# Patient Record
Sex: FEMALE | Race: WHITE | Employment: STUDENT | URBAN - METROPOLITAN AREA
[De-identification: names, ages, dates, MRNs, and addresses within clinical notes are randomized per-mention and may not be internally consistent; named-entity substitution may affect disease eponyms.]

---
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
## 2025-02-06 ENCOUNTER — OFFICE VISIT (OUTPATIENT)
Dept: INTERNAL MEDICINE CLINIC | Facility: CLINIC | Age: 20
End: 2025-02-06

## 2025-02-06 VITALS
DIASTOLIC BLOOD PRESSURE: 71 MMHG | HEIGHT: 61.42 IN | HEART RATE: 125 BPM | BODY MASS INDEX: 24.6 KG/M2 | OXYGEN SATURATION: 96 % | WEIGHT: 132 LBS | SYSTOLIC BLOOD PRESSURE: 130 MMHG

## 2025-02-06 DIAGNOSIS — N30.01 ACUTE CYSTITIS WITH HEMATURIA: ICD-10-CM

## 2025-02-06 DIAGNOSIS — R10.2 PELVIC PAIN: Primary | ICD-10-CM

## 2025-02-06 LAB
BILIRUBIN: NEGATIVE
GLUCOSE (URINE DIPSTICK): NEGATIVE MG/DL
KETONES (URINE DIPSTICK): NEGATIVE MG/DL
MULTISTIX LOT#: ABNORMAL NUMERIC
NITRITE, URINE: NEGATIVE
PH, URINE: 6 (ref 4.5–8)
PROTEIN (URINE DIPSTICK): 30 MG/DL
SPECIFIC GRAVITY: >=1.03 (ref 1–1.03)
UROBILINOGEN,SEMI-QN: 0.2 MG/DL (ref 0–1.9)

## 2025-02-06 PROCEDURE — 81003 URINALYSIS AUTO W/O SCOPE: CPT | Performed by: INTERNAL MEDICINE

## 2025-02-06 PROCEDURE — 99213 OFFICE O/P EST LOW 20 MIN: CPT | Performed by: INTERNAL MEDICINE

## 2025-02-06 RX ORDER — SULFAMETHOXAZOLE AND TRIMETHOPRIM 800; 160 MG/1; MG/1
1 TABLET ORAL 2 TIMES DAILY
Qty: 10 TABLET | Refills: 0 | Status: SHIPPED | OUTPATIENT
Start: 2025-02-06 | End: 2025-02-11

## 2025-02-06 NOTE — PROGRESS NOTES
Yesika Lockett is a 20 year old female.  Chief Complaint   Patient presents with    Acute     Pelvic pain     HPI:   Patient presented today with lower abdominal pain.  She states that morning she feels like she has to pee but nothing comes out.  No blood in her urine, no fever, but increased urgency and frequency.  She states that she has had.  For last 3 weeks which is normal since she got her 2 years ago.      Current Outpatient Medications   Medication Sig Dispense Refill    sulfamethoxazole-trimethoprim DS (BACTRIM DS) 800-160 MG Oral Tab per tablet Take 1 tablet by mouth 2 (two) times daily for 5 days. TAKE WITH FOOD. 10 tablet 0    albuterol 108 (90 Base) MCG/ACT Inhalation Aero Soln Inhale 2 puffs into the lungs every 6 (six) hours as needed for Wheezing (cough). (Patient not taking: Reported on 2/6/2025) 1 each 0    benzonatate 200 MG Oral Cap Take 1 capsule (200 mg total) by mouth 3 (three) times daily as needed for cough. (Patient not taking: Reported on 2/6/2025) 40 capsule 0      History reviewed. No pertinent past medical history.   History reviewed. No pertinent surgical history.   Social History:  Social History     Socioeconomic History    Marital status: Single   Tobacco Use    Smoking status: Never    Smokeless tobacco: Never   Vaping Use    Vaping status: Never Used   Substance and Sexual Activity    Alcohol use: Never    Drug use: Never     Social Drivers of Health     Food Insecurity: Unknown (12/7/2023)    Received from [facility]    Hunger Vital Sign     Worried About Running Out of Food in the Last Year: Never true   Transportation Needs: No Transportation Needs (9/3/2021)    Received from [facility]    PRAPARE - Transportation     Lack of Transportation (Medical): No     Lack of Transportation (Non-Medical): No      History reviewed. No pertinent family history.   Allergies[1]     REVIEW OF SYSTEMS:   Review of Systems   Review of Systems    Constitutional: Negative for activity change, appetite change and fever.   HENT: Negative for congestion and voice change.    Respiratory: Negative for cough and shortness of breath.    Cardiovascular: Negative for chest pain.   Gastrointestinal: Negative for abdominal distention, abdominal pain and vomiting.   Genitourinary: Negative for hematuria.   Skin: Negative for wound.   Psychiatric/Behavioral: Negative for behavioral problems.   Wt Readings from Last 5 Encounters:   02/06/25 132 lb (59.9 kg)   10/14/24 128 lb (58.1 kg) (50%, Z= 0.00)*     * Growth percentiles are based on CDC (Girls, 2-20 Years) data.     Body mass index is 24.6 kg/m².      EXAM:   /71   Pulse (!) 125   Ht 5' 1.42\" (1.56 m)   Wt 132 lb (59.9 kg)   LMP 02/06/2025 (Exact Date)   SpO2 96%   BMI 24.60 kg/m²   Physical Exam   Constitutional:       Appearance: Normal appearance.   HENT:      Head: Normocephalic.   Eyes:      Conjunctiva/sclera: Conjunctivae normal.   Breast:  Normal bilateral breast exam. No palpable masses or nodules.   No nipples asymmetry or discharge. No skin changes   Cardiovascular:      Rate and Rhythm: Normal rate and regular rhythm.      Heart sounds: Normal heart sounds. No murmur heard.  Pulmonary:      Effort: Pulmonary effort is normal.      Breath sounds: Normal breath sounds. No rhonchi or rales.   Abdominal:      General: Bowel sounds are normal.      Palpations: Abdomen is soft.      Tenderness: There is no abdominal tenderness.   Musculoskeletal:      Cervical back: Neck supple.      Right lower leg: No edema.      Left lower leg: No edema.   Skin:     General: Skin is warm and dry.   Neurological:      General: No focal deficit present.      Mental Status: He is alert and oriented to person, place, and time. Mental status is at baseline.   Psychiatric:         Mood and Affect: Mood normal.         Behavior: Behavior normal.       ASSESSMENT AND PLAN:     Assessment & Plan  Pelvic pain  - check  UA  Orders:    URINALYSIS, AUTO, W/O SCOPE    Acute cystitis with hematuria  - dip test positive for UTI   - start bactrim for 5 days   - increase hydration   - cranberry juice  - please also follow up with gyn for frequent periods.   Orders:    Urine Culture, Routine [E]; Future    sulfamethoxazole-trimethoprim DS (BACTRIM DS) 800-160 MG Oral Tab per tablet; Take 1 tablet by mouth 2 (two) times daily for 5 days. TAKE WITH FOOD.      The patient indicates understanding of these issues and agrees to the plan.      Etta Estrada MD     This note was created by Dragon voice recognition. Errors in content may be related to improper recognition by the system; efforts to review and correct have been done but errors may still exist. Please be advised the primary purpose of this note is for me to communicate medical care. Standard sentence structure is not always used. Medical terminology and medical abbreviations may be used. There may be grammatical, typographical, and automated fill ins that may have errors missed in proofreading.        [1] Not on File

## 2025-03-31 ENCOUNTER — OFFICE VISIT (OUTPATIENT)
Dept: OBGYN CLINIC | Facility: CLINIC | Age: 20
End: 2025-03-31

## 2025-03-31 ENCOUNTER — LAB ENCOUNTER (OUTPATIENT)
Dept: LAB | Age: 20
End: 2025-03-31
Attending: STUDENT IN AN ORGANIZED HEALTH CARE EDUCATION/TRAINING PROGRAM
Payer: COMMERCIAL

## 2025-03-31 VITALS
HEART RATE: 84 BPM | SYSTOLIC BLOOD PRESSURE: 118 MMHG | DIASTOLIC BLOOD PRESSURE: 77 MMHG | WEIGHT: 130 LBS | BODY MASS INDEX: 24 KG/M2

## 2025-03-31 DIAGNOSIS — N92.1 BREAKTHROUGH BLEEDING ON NEXPLANON: Primary | ICD-10-CM

## 2025-03-31 DIAGNOSIS — Z97.5 BREAKTHROUGH BLEEDING ON NEXPLANON: ICD-10-CM

## 2025-03-31 DIAGNOSIS — Z30.46 ENCOUNTER FOR REMOVAL OF SUBDERMAL CONTRACEPTIVE IMPLANT: ICD-10-CM

## 2025-03-31 DIAGNOSIS — Z97.5 BREAKTHROUGH BLEEDING ON NEXPLANON: Primary | ICD-10-CM

## 2025-03-31 DIAGNOSIS — N92.1 BREAKTHROUGH BLEEDING ON NEXPLANON: ICD-10-CM

## 2025-03-31 LAB
BASOPHILS # BLD AUTO: 0.04 X10(3) UL (ref 0–0.2)
BASOPHILS NFR BLD AUTO: 0.7 %
DEPRECATED RDW RBC AUTO: 38.5 FL (ref 35.1–46.3)
EOSINOPHIL # BLD AUTO: 0.06 X10(3) UL (ref 0–0.7)
EOSINOPHIL NFR BLD AUTO: 1.1 %
ERYTHROCYTE [DISTWIDTH] IN BLOOD BY AUTOMATED COUNT: 12.6 % (ref 11–15)
HCT VFR BLD AUTO: 39.4 %
HGB BLD-MCNC: 13 G/DL
IMM GRANULOCYTES # BLD AUTO: 0.02 X10(3) UL (ref 0–1)
IMM GRANULOCYTES NFR BLD: 0.4 %
LYMPHOCYTES # BLD AUTO: 1.48 X10(3) UL (ref 1–4)
LYMPHOCYTES NFR BLD AUTO: 27.4 %
MCH RBC QN AUTO: 27.4 PG (ref 26–34)
MCHC RBC AUTO-ENTMCNC: 33 G/DL (ref 31–37)
MCV RBC AUTO: 82.9 FL
MONOCYTES # BLD AUTO: 0.39 X10(3) UL (ref 0.1–1)
MONOCYTES NFR BLD AUTO: 7.2 %
NEUTROPHILS # BLD AUTO: 3.42 X10 (3) UL (ref 1.5–7.7)
NEUTROPHILS # BLD AUTO: 3.42 X10(3) UL (ref 1.5–7.7)
NEUTROPHILS NFR BLD AUTO: 63.2 %
PLATELET # BLD AUTO: 295 10(3)UL (ref 150–450)
RBC # BLD AUTO: 4.75 X10(6)UL
WBC # BLD AUTO: 5.4 X10(3) UL (ref 4–11)

## 2025-03-31 PROCEDURE — 99203 OFFICE O/P NEW LOW 30 MIN: CPT | Performed by: STUDENT IN AN ORGANIZED HEALTH CARE EDUCATION/TRAINING PROGRAM

## 2025-03-31 PROCEDURE — 36415 COLL VENOUS BLD VENIPUNCTURE: CPT

## 2025-03-31 PROCEDURE — 11982 REMOVE DRUG IMPLANT DEVICE: CPT | Performed by: STUDENT IN AN ORGANIZED HEALTH CARE EDUCATION/TRAINING PROGRAM

## 2025-03-31 PROCEDURE — 85025 COMPLETE CBC W/AUTO DIFF WBC: CPT

## 2025-03-31 NOTE — PATIENT INSTRUCTIONS
FACT SHEET: THE SHOT/DEPO-PROVERA           HOW DOES DEPO WORK?    Depo contains a hormone like the ones your body makes. This hormone stops your ovaries from releasing eggs.  Without an egg, you cannot get pregnant.   No method of birth control is 100% effective, but Depo is up to 99% effective if you get your shots on time.  About 5% of women will get pregnant within the first year of use though.    HOW DO I USE DEPO?    You get a Depo injection in the arm or in the buttocks.   Use condoms as back-up the first 7 days after your first shot of Depo.   You should get a shot every 3 months (every 12 weeks).    WHAT IF I AM LATE FOR THE NEXT SHOT?    Depo works best if you get a new shot every 12 weeks.     If your shot is more than 4 weeks late, you should get a pregnancy test before the next shot. You should use condoms for the next 7 days.    WHAT IF I AM LATE GETTING A SHOT AND HAD UNPROTECTED SEX?    If your last shot was more than 16 weeks ago, take Emergency Contraception (EC) right after unprotected sex.  EC can prevent pregnancy up to 5 days after sex, and it works better the sooner you take it.    HOW DOES DEPO HELP ME?    Depo is safe & effective.  It keeps you from getting pregnant for 3 months.    The shot lowers your risk of cancer of the uterus.   It is safe to breastfeed while on Depo.    HOW WILL I FEEL ON DEPO?    You will most likely have spotting between periods. You may have weight gain, bloating, headaches and/or mood changes.  Talk to your health care provider about treating any side effects.   After the first 2-3 shots, you may have no period at all.  This is normal.   Your bones may become slightly weaker while you take Depo. Bone strength returns to normal once you stop getting the shot.   After you stop Depo, it takes a few months for your fertility to return to normal. This means that it may take a while for you to get pregnant (even if you're trying) - but if you don't want to get pregnant,  you need to use a new form of birth control after you stop Depo.    DOES DEPO HAVE RISKS?    The shot is very safe. Severe problems are rare. If you have any of the symptoms below, call your doctor:   Severe headaches   Very heavy bleeding   Your health care provider can help you find out if these symptoms are signs of a severe problem.    *Remember, Depo does not protect you from Sexually Transmitted Infections or HIV. Always use condoms to protect yourself!

## 2025-03-31 NOTE — PROGRESS NOTES
[facility]  Obstetrics and Gynecology  Gyne Problem Visit      Yesika Lockett is a 20 year old female  presenting with concerns of irregular periods with Nexplanon which was placed in 2023. She has always been irregular since insertion but feels like symptoms have worsened over the last month. She will experience cycling between spotting and heavy bleeding. Periods were regular prior to implant. Denies any lightheadedness or dizziness. No abnormal vaginal discharge, odor, irritation, or itching. She is most interested in having implant removed.     Patient's last menstrual period was 2025 (exact date).     Pap: N/a  Contraception:Nexplanon    OBSTETRICS HISTORY:  OB History    Para Term  AB Living   0 0 0 0 0 0   SAB IAB Ectopic Multiple Live Births   0 0 0 0 0       GYNE HISTORY:  Hx Prior Abnormal Pap: No   Menarche: 14y/o (3/31/2025  9:44 AM)  Period Cycle (Days): 30days (3/31/2025  9:44 AM)  Period Duration (Days): 5days (3/31/2025  9:44 AM)  Period Flow: Moderate (3/31/2025  9:44 AM)  Use of Birth Control (if yes, specify type): Nexplanon (3/31/2025  9:44 AM)  Hx Prior Abnormal Pap: No (3/31/2025  9:44 AM)         No data to display                  History   Sexual Activity    Sexual activity: Not on file       MEDICAL HISTORY:  No past medical history on file.  History reviewed. No pertinent surgical history.    SOCIAL HISTORY:  Social History     Socioeconomic History    Marital status: Single     Spouse name: Not on file    Number of children: Not on file    Years of education: Not on file    Highest education level: Not on file   Occupational History    Not on file   Tobacco Use    Smoking status: Never    Smokeless tobacco: Never   Vaping Use    Vaping status: Never Used   Substance and Sexual Activity    Alcohol use: Never    Drug use: Never    Sexual activity: Not on file   Other Topics Concern    Not on file   Social History Narrative    Not on file     Social Drivers of Health      Food Insecurity: Unknown (12/7/2023)    Received from [facility]    Hunger Vital Sign     Worried About Running Out of Food in the Last Year: Never true     Ran Out of Food in the Last Year: Not on file   Transportation Needs: No Transportation Needs (9/3/2021)    Received from [facility]    PRAPARE - Transportation     Lack of Transportation (Medical): No     Lack of Transportation (Non-Medical): No   Stress: Not on file   Housing Stability: Not on file       MEDICATIONS:  No current outpatient medications on file.    ALLERGIES:  Allergies[1]      REVIEW OF SYSTEMS:  Review of Systems   Constitutional:  Negative for chills, fever and unexpected weight change.   Respiratory: Negative.     Cardiovascular: Negative.    Gastrointestinal:  Negative for abdominal pain, constipation, diarrhea and nausea.   Genitourinary:  Positive for menstrual problem. Negative for dyspareunia, dysuria, genital sores, hematuria, pelvic pain, vaginal bleeding, vaginal discharge and vaginal pain.   Musculoskeletal: Negative.    Skin: Negative.    Neurological: Negative.    Hematological: Negative.    Psychiatric/Behavioral: Negative.         PHYSICAL EXAM:  /77   Pulse 84   Wt 130 lb (59 kg)   LMP 03/01/2025 (Exact Date)   BMI 24.23 kg/m²     Nexplanon Removal    Removal:  2 % lidocaine was injected underneath the tip of the Nexplanon lukasz that is closest to the left elbow.  Nexplanon was located by palpation.  2 mm incision was made at the tip of the lukasz closest to the elbow.  Nexplanon was pushed toward the incision.  Nexplanon lukasz was sharply dissected from the tissue sheath.  The entire Nexplanon lukasz was removed.  Steri-Strips were applied to the skin incision.  Patient was instructed to remove Steri-Strips and band aid in 5 days.  All of the patient's questions were addressed.       ASSESSMENT:       ICD-10-CM    1. Breakthrough bleeding on Nexplanon  N92.1 CBC With Differential  With Platelet    Z97.5       2. Encounter for removal of subdermal contraceptive implant  Z30.46 REMOVAL, SUB-DERMAL CONTRACEPTIVE IMPLANT [[id number]]          Plan:  - Discussed options such as OCPs or NSAIDs to help stop bleeding vs removal. Patient opted for removal.   - No difficulties with removal.  - Discussed other forms of hormonal contraception, patient most interested in DMPA. Information provided, she will notify us when she decides to start injections or if she chooses another form.      JAY HORNE PA-C  9:48 AM  3/31/2025        Spent total time 20 minutes on obtaining history / chart review, evaluating patient / performing medically appropriate exam, discussing treatment options, counseling / educating, and completing documentation, coordinating care.       [1] Not on File

## (undated) NOTE — LETTER
AUTHORIZATION FOR SURGICAL OPERATION OR OTHER PROCEDURE    1. I hereby authorize Alex Cedillo PA-C, and [facility] staff assigned to my case to perform the following operation and/or procedure at the [facility] site: Nexplanon removal   _______________________________________________________________________________________________    2.  My physician has explained the nature and purpose of the operation or other procedure, possible alternative methods of treatment, the risks involved, and the possibility of complication to me.  I acknowledge that no guarantee has been made as to the result that may be obtained.  3.  I recognize that, during the course of this operation, or other procedure, unforseen conditions may necessitate additional or different procedure than those listed above.  I, therefore, further authorize and request that the above named physician, his/her physician assistants or designees perform such procedures as are, in his/her professional opinion, necessary and desirable.  4.  Any tissue or organs removed in the operation or other procedure may be disposed of by and at the discretion of the [facility] and [facility].  5.  I understand that in the event of a medical emergency, I will be transported by local paramedics to [facility] or other hospital emergency department.  6.  I certify that I have read and fully understand the above consent to operation and/or other procedure.    7.  I acknowledge that my physician has explained sedation/analgesia administration to me including the risks and benefits.  I consent to the administration of sedation/analgesia as may be necessary or desirable in the judgement of my physician.    Witness signature: ___________________________________________________ Date:  _03_/_31__/_25_                    Time:  ________ A.M.  P.M.       Patient Name:   ______________________________________________________  (please print)      Patient signature:  ___________________________________________________             Relationship to Patient:           []  Parent    Responsible person                          []  Spouse  In case of minor or                    [] Other  _____________   Incompetent name:  __________________________________________________                               (please print)      _____________      Responsible person  In case of minor or  Incompetent signature:  _______________________________________________    Statement of Physician  My signature below affirms that prior to the time of the procedure, I have explained to the patient and/or his/her guardian, the risks and benefits involved in the proposed treatment and any reasonable alternative to the proposed treatment.  I have also explained the risks and benefits involved in the refusal of the proposed treatment and have answered the patient's questions.                        Date:  __03_/__31_/__25___  Provider                      Signature:  __________________________________________________________       Time:  ___________ A.M    P.M.